# Patient Record
Sex: FEMALE
[De-identification: names, ages, dates, MRNs, and addresses within clinical notes are randomized per-mention and may not be internally consistent; named-entity substitution may affect disease eponyms.]

---

## 2022-06-13 PROBLEM — Z00.00 ENCOUNTER FOR PREVENTIVE HEALTH EXAMINATION: Status: ACTIVE | Noted: 2022-06-13

## 2023-05-03 ENCOUNTER — APPOINTMENT (OUTPATIENT)
Dept: ORTHOPEDIC SURGERY | Facility: CLINIC | Age: 43
End: 2023-05-03
Payer: MEDICARE

## 2023-05-03 VITALS — WEIGHT: 163 LBS | BODY MASS INDEX: 27.83 KG/M2 | HEIGHT: 64 IN

## 2023-05-03 DIAGNOSIS — M25.641 STIFFNESS OF RIGHT HAND, NOT ELSEWHERE CLASSIFIED: ICD-10-CM

## 2023-05-03 DIAGNOSIS — M72.0 PALMAR FASCIAL FIBROMATOSIS [DUPUYTREN]: ICD-10-CM

## 2023-05-03 PROCEDURE — 99213 OFFICE O/P EST LOW 20 MIN: CPT

## 2023-05-07 NOTE — IMAGING
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 \par  strength is 5/5.\par \par Incidentally noted: \par Right hand 2nd-5th digits with chronic stiffness s/p trauma ("several years")\par There is no ttp noted. \par All digits are nvi with no triggering noted.

## 2023-05-07 NOTE — ASSESSMENT
[FreeTextEntry1] : The patient was advised of the diagnosis. The natural history of the pathology was explained in full to the patient in layman's terms. All questions were answered. The risks and benefits of surgical and non-surgical treatment alternatives were explained in full to the patient.\par \par explained pathology of dupuytrens and what to do if it worsens. r/b/a of treatment discussed \par Pt and caretaker provided reassurance.\par Pt will rto if Palmar fibromatosis progresses and causes contractures.\par RTO prn.

## 2023-05-07 NOTE — HISTORY OF PRESENT ILLNESS
[Gradual] : gradual [de-identified] : \par 5/3/2023:Pt here for f/u of left hand Palmar fibromatosis. \par caretaker provided additional history\par \par 3/24/2023:  left hand mass developed at least 3 motnhs ago. no trauma. RHD. [] : no [FreeTextEntry1] : left wrist [FreeTextEntry5] : patient has been seen for the mass in hand in the past by dr. kaufman  [de-identified] : mandeep